# Patient Record
Sex: FEMALE | Race: WHITE | NOT HISPANIC OR LATINO | Employment: UNEMPLOYED | URBAN - NONMETROPOLITAN AREA
[De-identification: names, ages, dates, MRNs, and addresses within clinical notes are randomized per-mention and may not be internally consistent; named-entity substitution may affect disease eponyms.]

---

## 2018-09-20 ENCOUNTER — OFFICE VISIT (OUTPATIENT)
Dept: UROLOGY | Facility: CLINIC | Age: 63
End: 2018-09-20

## 2018-09-20 VITALS
OXYGEN SATURATION: 91 % | HEIGHT: 67 IN | DIASTOLIC BLOOD PRESSURE: 80 MMHG | SYSTOLIC BLOOD PRESSURE: 130 MMHG | HEART RATE: 74 BPM | TEMPERATURE: 98 F

## 2018-09-20 DIAGNOSIS — N39.0 RECURRENT UTI: Primary | ICD-10-CM

## 2018-09-20 DIAGNOSIS — R10.9 FLANK PAIN: ICD-10-CM

## 2018-09-20 LAB
BILIRUB BLD-MCNC: NEGATIVE MG/DL
CLARITY, POC: ABNORMAL
COLOR UR: YELLOW
GLUCOSE UR STRIP-MCNC: NEGATIVE MG/DL
KETONES UR QL: NEGATIVE
LEUKOCYTE EST, POC: ABNORMAL
NITRITE UR-MCNC: NEGATIVE MG/ML
PH UR: 7.5 [PH] (ref 5–8)
PROT UR STRIP-MCNC: NEGATIVE MG/DL
RBC # UR STRIP: ABNORMAL /UL
SP GR UR: 1.01 (ref 1–1.03)
UROBILINOGEN UR QL: NORMAL

## 2018-09-20 PROCEDURE — 99204 OFFICE O/P NEW MOD 45 MIN: CPT | Performed by: UROLOGY

## 2018-09-20 PROCEDURE — 51798 US URINE CAPACITY MEASURE: CPT | Performed by: UROLOGY

## 2018-09-20 PROCEDURE — 81003 URINALYSIS AUTO W/O SCOPE: CPT | Performed by: UROLOGY

## 2018-09-20 RX ORDER — BUDESONIDE AND FORMOTEROL FUMARATE DIHYDRATE 160; 4.5 UG/1; UG/1
AEROSOL RESPIRATORY (INHALATION)
COMMUNITY
Start: 2014-08-04

## 2018-09-20 RX ORDER — FLUCONAZOLE 150 MG/1
150 TABLET ORAL ONCE
Qty: 1 TABLET | Refills: 0 | Status: SHIPPED | OUTPATIENT
Start: 2018-09-20 | End: 2018-09-20

## 2018-09-20 RX ORDER — ALPRAZOLAM 1 MG/1
1 TABLET ORAL
COMMUNITY
Start: 2018-09-05

## 2018-09-20 RX ORDER — ALBUTEROL SULFATE 90 UG/1
AEROSOL, METERED RESPIRATORY (INHALATION)
COMMUNITY
Start: 2018-09-04

## 2018-09-20 RX ORDER — HYDROCHLOROTHIAZIDE 25 MG/1
TABLET ORAL
COMMUNITY
Start: 2018-09-05

## 2018-09-20 RX ORDER — SULFAMETHOXAZOLE AND TRIMETHOPRIM 800; 160 MG/1; MG/1
1 TABLET ORAL 2 TIMES DAILY
Qty: 14 TABLET | Refills: 0 | Status: SHIPPED | OUTPATIENT
Start: 2018-09-20 | End: 2018-09-27

## 2018-09-20 NOTE — PROGRESS NOTES
Chief Complaint  Frequent UTI    Referring Provider:  Steffany Rooney Abn*    HPI  Ms. Blake is a 62 y.o. female w/ hx of severe COPD who presents as a referral for multiple UTIs. She is on 4L of O2 at home.   She reports approximately 4 infections in the last 6 months.    Her symptoms with UTI include pain this located in the SP area that is nonradiaiting, associated w/ urgency, dysuria.  Its quality is severe.  It is intermittent.  She is experiencing this today.    no History of inpatient hospitalized for these infections?    yes Records of positive urine cultures? + for Klebsiella in Cx from Dr. Rooney, wagner-sensitive 8/20/18  no Relationship with the onset of these infections and sexual activity?    no Use of barrier contraception or a spermicidal products?   no Ongoing problems with constipation?     yes Urinary incontinence?  MARCIO; does not normally wear pads        yes History of smoking? 1ppd for 25 yrs        no History of gross hematuria?   She admits to vaginal dryness      Past Medical History  Past Medical History:   Diagnosis Date   • COPD (chronic obstructive pulmonary disease) (CMS/[facility])    • Depression    • Frequent UTI    • HTN (hypertension)        Past Surgical History  Past Surgical History:   Procedure Laterality Date   • ENDOSCOPY     • HYSTERECTOMY         Medications    Current Outpatient Prescriptions:   •  ALPRAZolam (XANAX) 0.5 MG tablet, , Disp: , Rfl:   •  budesonide-formoterol (SYMBICORT) 160-4.5 MCG/ACT inhaler, Symbicort 160-4.5 MCG/ACT Inhalation Aerosol; Patient Sig: Symbicort 160-4.5 MCG/ACT Inhalation Aerosol ; 0; 04-Aug-2014; Active, Disp: , Rfl:   •  hydrochlorothiazide (HYDRODIURIL) 25 MG tablet, , Disp: , Rfl:   •  sertraline (ZOLOFT) 50 MG tablet, , Disp: , Rfl:   •  tiotropium (SPIRIVA HANDIHALER) 18 MCG per inhalation capsule, Spiriva HandiHaler CAPS; Patient Sig: Spiriva HandiHaler CAPS ; 0; 04-Aug-2014; Active, Disp: , Rfl:   •  VENTOLIN  (90 Base)  "MCG/ACT inhaler, , Disp: , Rfl:     Allergies  No Known Allergies    Social History  Social History     Social History   • Marital status:      Spouse name: N/A   • Number of children: N/A   • Years of education: N/A     Occupational History   • Not on file.     Social History Main Topics   • Smoking status: Former Smoker   • Smokeless tobacco: Never Used   • Alcohol use No   • Drug use: No   • Sexual activity: Not on file     Other Topics Concern   • Not on file     Social History Narrative   • No narrative on file       Family History  She has + family history of kidney stones, in mother and brother    Review of Systems  Constitutional: No fevers or chills today; + hot flashes  Skin: Negative for rash  Endocrine: No heat/cold intolerance   Cardiovascular: Negative for chest pain or dyspnea on exertion  Respiratory: Negative for shortness of breath or wheezing  Gastrointestinal: No constipation, nausea or vomiting  Genitourinary: + for new lower urinary tract symptoms, dysuria, today  Musculoskeletal: No flank pain  Neurological:  Negative for frequent headaches or dizziness  Lymph/Heme: Negative for leg swelling or calf pain.    Physical Exam  Visit Vitals  /80   Pulse 74   Temp 98 °F (36.7 °C)   Ht 170.2 cm (67.01\")   SpO2 91%     Constitutional: NAD, WDWN.   HEENT: NCAT. Conjunctivae normal.  MMM.    Cardiovascular:  She has regular rate.  Pulmonary/Chest: Respirations are even and non-labored bilaterally.  Abdominal: Soft. No distension, tenderness, masses or guarding. No CVA tenderness. + Sp tenderness  Neurological: A + O x 3.  Cranial Nerves II-XII grossly intact.  Extremities: JUSTIN x 4, Warm. No clubbing.  No cyanosis.    Skin: Pink, warm and dry.  No rashes noted.  Psychiatric:  Normal mood and affect  Genitourinary:   deferred    Labs  Lab Results   Component Value Date    CREATININE 0.8 08/11/2014          Brief Urine Lab Results  (Last result in the past 365 days)      Color   Clarity   " Blood   Leuk Est   Nitrite   Protein   CREAT   Urine HCG        09/20/18 0941 Yellow Cloudy(A) 1+(A) Large (3+)(A) Negative Negative                 Post-Void Residual  A post-void residual was measured by ultrasonic bladder scanner.  73    Radiographic Studies  None recently       Assessment  Ms. Blake is a 62 y.o. female who presents with recurrent UTIs.    The patient's risk factors to developing recurrent UTIs include post-menopausal, obesity, and possible immune suppression with her chronic inhaled steroid.    Plan  1.  Encourage fluid consumption at the onset of a symptomatic UTI.  2.  Various treatment strategies were discussed with the patient including antibiotics in the form of on-demand, post-coital and suppressive daily dosing.  As she is post-menopausal we also discussed topical vaginal estrogen cream.  3.  Coordinate her next CT chest w/ CT a/p noncontrast to eval for stones; she will follow-up with me after her scans for cystoscopy and pelvic exam  4.  Treat empirically w/ 1 week Bactrim  5.  Send urine for Cx       Kenton Iyer MD

## 2018-11-08 ENCOUNTER — PROCEDURE VISIT (OUTPATIENT)
Dept: UROLOGY | Facility: CLINIC | Age: 63
End: 2018-11-08

## 2018-11-08 VITALS — HEIGHT: 67 IN

## 2018-11-08 DIAGNOSIS — N95.2 VAGINAL ATROPHY: ICD-10-CM

## 2018-11-08 DIAGNOSIS — N39.0 RECURRENT UTI: Primary | ICD-10-CM

## 2018-11-08 PROCEDURE — 52000 CYSTOURETHROSCOPY: CPT | Performed by: UROLOGY

## 2018-11-08 PROCEDURE — 99213 OFFICE O/P EST LOW 20 MIN: CPT | Performed by: UROLOGY

## 2018-11-08 RX ORDER — ESTRADIOL 0.1 MG/G
CREAM VAGINAL
Qty: 42.5 G | Refills: 12 | Status: SHIPPED | OUTPATIENT
Start: 2018-11-08 | End: 2019-11-25 | Stop reason: SDUPTHER

## 2018-11-08 RX ORDER — SULFAMETHOXAZOLE AND TRIMETHOPRIM 800; 160 MG/1; MG/1
1 TABLET ORAL 2 TIMES DAILY
Qty: 6 TABLET | Refills: 2 | Status: SHIPPED | OUTPATIENT
Start: 2018-11-08 | End: 2018-11-11

## 2018-11-08 NOTE — PROGRESS NOTES
Chief Complaint  Frequent UTI    Referring Provider:  No ref. provider found    HPI  Ms. Blake is a 62 y.o. female w/ hx of severe COPD who presents as a referral for multiple UTIs. She is on 4L of O2 at home.   She reports approximately 4 infections in the last 6 months.    She presents today for follow-up.  She states that her UTI symptoms have all gone away after she was treated with an antibiotic.  She also is here today for cystoscopy, in addition to a follow-up for her symptoms.    To review,  Her symptoms with UTI include pain this located in the SP area that is nonradiaiting, associated w/ urgency, dysuria.  Its quality is severe.  It is intermittent.    no History of inpatient hospitalized for these infections?    yes Records of positive urine cultures? + for Klebsiella in Cx from Dr. Rooney, wagner-sensitive 8/20/18  no Relationship with the onset of these infections and sexual activity?  no Use of barrier contraception or a spermicidal products?   no Ongoing problems with constipation?     yes Urinary incontinence?  MARCIO; does not normally wear pads      yes History of smoking? 1ppd for 25 yrs        no History of gross hematuria?   She admits to vaginal dryness    Past Medical History  Past Medical History:   Diagnosis Date   • COPD (chronic obstructive pulmonary disease) (CMS/[facility])    • Depression    • Frequent UTI    • HTN (hypertension)        Past Surgical History  Past Surgical History:   Procedure Laterality Date   • ENDOSCOPY     • HYSTERECTOMY         Medications    Current Outpatient Prescriptions:   •  ALPRAZolam (XANAX) 0.5 MG tablet, , Disp: , Rfl:   •  budesonide-formoterol (SYMBICORT) 160-4.5 MCG/ACT inhaler, Symbicort 160-4.5 MCG/ACT Inhalation Aerosol; Patient Sig: Symbicort 160-4.5 MCG/ACT Inhalation Aerosol ; 0; 04-Aug-2014; Active, Disp: , Rfl:   •  hydrochlorothiazide (HYDRODIURIL) 25 MG tablet, , Disp: , Rfl:   •  sertraline (ZOLOFT) 50 MG tablet, , Disp: , Rfl:   •  tiotropium  "(SPIRIVA HANDIHALER) 18 MCG per inhalation capsule, Spiriva HandiHaler CAPS; Patient Sig: Spiriva HandiHaler CAPS ; 0; 04-Aug-2014; Active, Disp: , Rfl:   •  VENTOLIN  (90 Base) MCG/ACT inhaler, , Disp: , Rfl:     Allergies  No Known Allergies    Social History  Social History     Social History   • Marital status:      Spouse name: N/A   • Number of children: N/A   • Years of education: N/A     Occupational History   • Not on file.     Social History Main Topics   • Smoking status: Former Smoker   • Smokeless tobacco: Never Used   • Alcohol use No   • Drug use: No   • Sexual activity: Not on file     Other Topics Concern   • Not on file     Social History Narrative   • No narrative on file       Family History  She has + family history of kidney stones, in mother and brother    Review of Systems  Constitutional: No fevers or chills today; + hot flashes  Skin: Negative for rash  Endocrine: No heat/cold intolerance   Cardiovascular: Negative for chest pain or dyspnea on exertion  Respiratory: Negative for shortness of breath or wheezing  Gastrointestinal: No constipation, nausea or vomiting  Genitourinary: + for new lower urinary tract symptoms, dysuria, today  Musculoskeletal: No flank pain  Neurological:  Negative for frequent headaches or dizziness  Lymph/Heme: Negative for leg swelling or calf pain.    Physical Exam  Visit Vitals  Ht 170.2 cm (67.01\")     Constitutional: NAD, WDWN.   HEENT: NCAT. Conjunctivae normal.  MMM.    Cardiovascular:  She has regular rate.  Pulmonary/Chest: Respirations are even and non-labored bilaterally.  Abdominal: Soft. No distension, tenderness, masses or guarding. No CVA tenderness. + Sp tenderness  Neurological: A + O x 3.  Cranial Nerves II-XII grossly intact.  Extremities: JUSTIN x 4, Warm. No clubbing.  No cyanosis.    Skin: Pink, warm and dry.  No rashes noted.  Psychiatric:  Normal mood and affect  Genitourinary:   Decreased estrogenization of the vaginal tissue.  " Cervix surgically absent.  No cystocele rectocele or fall prolapse.  No stress incontinence on cough test.  No myofascial tenderness to palpation or anterior vaginal masses    Labs  Lab Results   Component Value Date    CREATININE 0.8 08/11/2014          Brief Urine Lab Results  (Last result in the past 365 days)      Color   Clarity   Blood   Leuk Est   Nitrite   Protein   CREAT   Urine HCG        09/20/18 0941 Yellow Cloudy(A) 1+(A) Large (3+)(A) Negative Negative               Post-Void Residual  A post-void residual was measured by ultrasonic bladder scanner at last visit  73    Preprocedure diagnosis  Wisam    Postprocedure diagnosis  same    Procedure  Flexible Cystourethroscopy    Attending surgeon  Kenton Iyer MD    Anesthesia  2% lidocaine jelly intraurethrally    Complications  None    Indications  62 y.o. female undergoing a flexible cystoscopy for the above mentioned indications.    Informed consent was obtained.      Findings  Cystoscopy revealed one right and left ureteral orifice in the normal anatomic position, normal bladder mucosa and no tumors, masses or stones.      Procedure  The patient was placed in supine position and prepped and draped in sterile fashion with lidocaine jelly per urethra for anesthesia.  A timeout was performed.  The 14F flexible cystoscope was lubricated and gently placed through the urethra and into the bladder.  The bladder was completely visualized.  The cystoscope was retroflexed and the bladder neck visualized.  The scope was withdrawn and the procedure terminated.  The patient tolerated the procedure well.          Radiographic Studies  She had a CT chest abdomen pelvis performed on 11/2/18 without contrast  I have personally reviewed the images and there are no stones or hydronephrosis; the bladder is full but normal-appearing       Assessment  Ms. Blake is a 62 y.o. female who presents with recurrent UTIs.    The patient's risk factors to developing  recurrent UTIs include post-menopausal, obesity, and possible immune suppression with her chronic inhaled steroid.    Plan  1.  Encourage fluid consumption at the onset of a symptomatic UTI.  2.  Various treatment strategies were discussed with the patient including antibiotics in the form of on-demand, post-coital and suppressive daily dosing.  We will do on-demand  As she is post-menopausal we also discussed topical vaginal estrogen cream.  And I will prescribe this today  3.  Follow-up in 1 year        Kenton Iyer MD

## 2019-05-28 ENCOUNTER — LAB (OUTPATIENT)
Dept: UROLOGY | Facility: CLINIC | Age: 64
End: 2019-05-28

## 2019-05-28 DIAGNOSIS — N39.0 URINARY TRACT INFECTION WITH HEMATURIA, SITE UNSPECIFIED: Primary | ICD-10-CM

## 2019-05-28 DIAGNOSIS — R31.9 URINARY TRACT INFECTION WITH HEMATURIA, SITE UNSPECIFIED: Primary | ICD-10-CM

## 2019-05-28 LAB
BILIRUB BLD-MCNC: NEGATIVE MG/DL
CLARITY, POC: ABNORMAL
COLOR UR: YELLOW
GLUCOSE UR STRIP-MCNC: NEGATIVE MG/DL
KETONES UR QL: NEGATIVE
LEUKOCYTE EST, POC: ABNORMAL
NITRITE UR-MCNC: POSITIVE MG/ML
PH UR: 6.5 [PH] (ref 5–8)
PROT UR STRIP-MCNC: NEGATIVE MG/DL
RBC # UR STRIP: ABNORMAL /UL
SP GR UR: 1 (ref 1–1.03)
UROBILINOGEN UR QL: NORMAL

## 2019-05-28 PROCEDURE — 81003 URINALYSIS AUTO W/O SCOPE: CPT | Performed by: UROLOGY

## 2019-05-30 LAB
BACTERIA UR CULT: ABNORMAL
BACTERIA UR CULT: ABNORMAL
OTHER ANTIBIOTIC SUSC ISLT: ABNORMAL

## 2019-06-12 ENCOUNTER — TELEPHONE (OUTPATIENT)
Dept: UROLOGY | Facility: CLINIC | Age: 64
End: 2019-06-12

## 2019-06-12 DIAGNOSIS — N39.0 RECURRENT UTI: Primary | ICD-10-CM

## 2019-06-12 RX ORDER — SULFAMETHOXAZOLE AND TRIMETHOPRIM 800; 160 MG/1; MG/1
1 TABLET ORAL 2 TIMES DAILY
Qty: 6 TABLET | Refills: 1 | Status: SHIPPED | OUTPATIENT
Start: 2019-06-12 | End: 2019-11-25 | Stop reason: SDUPTHER

## 2019-06-12 NOTE — TELEPHONE ENCOUNTER
I sent them in.  Before you tell her yes over the phone, make her promise that she will always bring her urine and for culture if she is having a UTI.

## 2019-11-25 ENCOUNTER — OFFICE VISIT (OUTPATIENT)
Dept: UROLOGY | Facility: CLINIC | Age: 64
End: 2019-11-25

## 2019-11-25 VITALS
DIASTOLIC BLOOD PRESSURE: 90 MMHG | HEART RATE: 94 BPM | BODY MASS INDEX: 27 KG/M2 | OXYGEN SATURATION: 98 % | RESPIRATION RATE: 18 BRPM | WEIGHT: 172 LBS | HEIGHT: 67 IN | SYSTOLIC BLOOD PRESSURE: 138 MMHG

## 2019-11-25 DIAGNOSIS — N39.0 RECURRENT UTI: Primary | ICD-10-CM

## 2019-11-25 DIAGNOSIS — N95.2 VAGINAL ATROPHY: ICD-10-CM

## 2019-11-25 PROCEDURE — 99213 OFFICE O/P EST LOW 20 MIN: CPT | Performed by: UROLOGY

## 2019-11-25 RX ORDER — SULFAMETHOXAZOLE AND TRIMETHOPRIM 800; 160 MG/1; MG/1
1 TABLET ORAL 2 TIMES DAILY
Qty: 6 TABLET | Refills: 1 | Status: SHIPPED | OUTPATIENT
Start: 2019-11-25

## 2019-11-25 RX ORDER — ESTRADIOL 0.1 MG/G
CREAM VAGINAL
Qty: 42.5 G | Refills: 12 | Status: SHIPPED | OUTPATIENT
Start: 2019-11-25 | End: 2021-01-01 | Stop reason: SDUPTHER

## 2019-11-25 RX ORDER — CIPROFLOXACIN 250 MG/1
250 TABLET, FILM COATED ORAL EVERY 12 HOURS
Refills: 0 | COMMUNITY
Start: 2019-09-19

## 2019-11-25 NOTE — PROGRESS NOTES
Chief Complaint  Frequent UTI      HPI  Ms. Blake is a 64 y.o. female w/ hx of severe COPD who presents as a referral for multiple UTIs. She is on 4L of O2 at home.     She presents today for follow-up.  She had one interim urinary infection.  Just started taking the Estrace.    To review,  Her symptoms with UTI include pain this located in the SP area that is nonradiaiting, associated w/ urgency, dysuria.  Its quality is severe.  It is intermittent.    no History of inpatient hospitalized for these infections?    yes Records of positive urine cultures? + for Klebsiella in Cx from Dr. Rooney, wagner-sensitive 8/20/18  no Relationship with the onset of these infections and sexual activity?  no Use of barrier contraception or a spermicidal products?   no Ongoing problems with constipation?     yes Urinary incontinence?  MARCIO; does not normally wear pads      yes History of smoking? 1ppd for 25 yrs        no History of gross hematuria?   She admits to vaginal dryness    Past Medical History  Past Medical History:   Diagnosis Date   • COPD (chronic obstructive pulmonary disease) (CMS/[facility])    • Depression    • Frequent UTI    • HTN (hypertension)        Past Surgical History  Past Surgical History:   Procedure Laterality Date   • ENDOSCOPY     • HYSTERECTOMY         Medications    Current Outpatient Medications:   •  ALPRAZolam (XANAX) 0.5 MG tablet, , Disp: , Rfl:   •  budesonide-formoterol (SYMBICORT) 160-4.5 MCG/ACT inhaler, Symbicort 160-4.5 MCG/ACT Inhalation Aerosol; Patient Sig: Symbicort 160-4.5 MCG/ACT Inhalation Aerosol ; 0; 04-Aug-2014; Active, Disp: , Rfl:   •  ciprofloxacin (CIPRO) 250 MG tablet, Take 250 mg by mouth Every 12 (Twelve) Hours., Disp: , Rfl: 0  •  estradiol (ESTRACE) 0.1 MG/GM vaginal cream, Apply small dab to vaginal area 3 times per week, Disp: 42.5 g, Rfl: 12  •  hydrochlorothiazide (HYDRODIURIL) 25 MG tablet, , Disp: , Rfl:   •  sertraline (ZOLOFT) 50 MG tablet, , Disp: , Rfl:   •   "sulfamethoxazole-trimethoprim (BACTRIM DS) 800-160 MG per tablet, Take 1 tablet by mouth 2 (Two) Times a Day., Disp: 6 tablet, Rfl: 1  •  tiotropium (SPIRIVA HANDIHALER) 18 MCG per inhalation capsule, Spiriva HandiHaler CAPS; Patient Sig: Spiriva HandiHaler CAPS ; 0; 04-Aug-2014; Active, Disp: , Rfl:   •  VENTOLIN  (90 Base) MCG/ACT inhaler, , Disp: , Rfl:     Allergies  No Known Allergies    Social History  Social History     Socioeconomic History   • Marital status:      Spouse name: Not on file   • Number of children: Not on file   • Years of education: Not on file   • Highest education level: Not on file   Tobacco Use   • Smoking status: Former Smoker   • Smokeless tobacco: Never Used   Substance and Sexual Activity   • Alcohol use: No   • Drug use: No       Family History  She has + family history of kidney stones, in mother and brother    Review of Systems  Constitutional: No fevers or chills today; + hot flashes  Skin: Negative for rash  Endocrine: No heat/cold intolerance   Cardiovascular: Negative for chest pain or dyspnea on exertion  Respiratory: Negative for shortness of breath or wheezing  Gastrointestinal: No constipation, nausea or vomiting  Genitourinary: + for new lower urinary tract symptoms, dysuria, today  Musculoskeletal: No flank pain  Neurological:  Negative for frequent headaches or dizziness  Lymph/Heme: Negative for leg swelling or calf pain.    Physical Exam  Visit Vitals  /90   Pulse 94   Resp 18   Ht 170.2 cm (67.01\")   Wt 78 kg (172 lb)   SpO2 98%   BMI 26.93 kg/m²     Constitutional: NAD, WDWN.   HEENT: NCAT. Conjunctivae normal.  MMM.    Cardiovascular:  She has regular rate.  Pulmonary/Chest: Respirations are even and non-labored bilaterally.  Abdominal: Soft. No distension, tenderness, masses or guarding. No CVA tenderness. + Sp tenderness  Neurological: A + O x 3.  Cranial Nerves II-XII grossly intact.  Extremities: JUSTIN x 4, Warm. No clubbing.  No cyanosis.  "   Skin: Pink, warm and dry.  No rashes noted.  Psychiatric:  Normal mood and affect  Genitourinary:   Decreased estrogenization of the vaginal tissue.  Cervix surgically absent.  No cystocele rectocele or fall prolapse.  No stress incontinence on cough test.  No myofascial tenderness to palpation or anterior vaginal masses    Labs  Lab Results   Component Value Date    CREATININE 0.8 08/11/2014          Brief Urine Lab Results  (Last result in the past 365 days)      Color   Clarity   Blood   Leuk Est   Nitrite   Protein   CREAT   Urine HCG        05/28/19 1018 Yellow Cloudy 2+ Large (3+) Positive Negative                   Radiographic Studies  She had a CT chest abdomen pelvis performed on 11/2/18 without contrast  I have personally reviewed the images and there are no stones or hydronephrosis; the bladder is full but normal-appearing       Assessment  Ms. Blake is a 64 y.o. female who presents with recurrent UTIs.    The patient's risk factors to developing recurrent UTIs include post-menopausal, obesity, and possible immune suppression with her chronic inhaled steroid.    Plan  1.  Encourage fluid consumption at the onset of a symptomatic UTI.  2.  Various treatment strategies were discussed with the patient including antibiotics in the form of on-demand, post-coital and suppressive daily dosing.  We will do on-demand  As she is post-menopausal we also discussed topical vaginal estrogen cream.    3.  Follow-up as needed        Kenton Iyer MD

## 2021-01-01 ENCOUNTER — APPOINTMENT (OUTPATIENT)
Dept: GENERAL RADIOLOGY | Facility: HOSPITAL | Age: 66
End: 2021-01-01

## 2021-01-01 ENCOUNTER — HOSPITAL ENCOUNTER (EMERGENCY)
Facility: HOSPITAL | Age: 66
Discharge: ANOTHER HOSPITAL | End: 2021-07-02
Attending: EMERGENCY MEDICINE | Admitting: EMERGENCY MEDICINE

## 2021-01-01 ENCOUNTER — OFFICE VISIT (OUTPATIENT)
Dept: PULMONOLOGY | Facility: CLINIC | Age: 66
End: 2021-01-01

## 2021-01-01 VITALS
OXYGEN SATURATION: 87 % | HEIGHT: 67 IN | DIASTOLIC BLOOD PRESSURE: 84 MMHG | SYSTOLIC BLOOD PRESSURE: 142 MMHG | BODY MASS INDEX: 22.76 KG/M2 | RESPIRATION RATE: 16 BRPM | HEART RATE: 91 BPM | WEIGHT: 145 LBS

## 2021-01-01 VITALS
OXYGEN SATURATION: 96 % | SYSTOLIC BLOOD PRESSURE: 175 MMHG | WEIGHT: 160 LBS | TEMPERATURE: 97.8 F | RESPIRATION RATE: 24 BRPM | HEART RATE: 101 BPM | BODY MASS INDEX: 25.11 KG/M2 | DIASTOLIC BLOOD PRESSURE: 82 MMHG | HEIGHT: 67 IN

## 2021-01-01 DIAGNOSIS — R09.02 HYPOXIA: ICD-10-CM

## 2021-01-01 DIAGNOSIS — Z87.891 PERSONAL HISTORY OF TOBACCO USE, PRESENTING HAZARDS TO HEALTH: ICD-10-CM

## 2021-01-01 DIAGNOSIS — J44.9 CHRONIC OBSTRUCTIVE PULMONARY DISEASE, UNSPECIFIED COPD TYPE (HCC): ICD-10-CM

## 2021-01-01 DIAGNOSIS — J96.21 ACUTE ON CHRONIC RESPIRATORY FAILURE WITH HYPOXIA AND HYPERCAPNIA (HCC): Primary | ICD-10-CM

## 2021-01-01 DIAGNOSIS — R06.02 SHORTNESS OF BREATH: Primary | ICD-10-CM

## 2021-01-01 DIAGNOSIS — J96.22 ACUTE ON CHRONIC RESPIRATORY FAILURE WITH HYPOXIA AND HYPERCAPNIA (HCC): Primary | ICD-10-CM

## 2021-01-01 DIAGNOSIS — J43.9 PULMONARY EMPHYSEMA, UNSPECIFIED EMPHYSEMA TYPE (HCC): ICD-10-CM

## 2021-01-01 DIAGNOSIS — N39.0 RECURRENT UTI: ICD-10-CM

## 2021-01-01 DIAGNOSIS — J44.1 COPD WITH ACUTE EXACERBATION (HCC): ICD-10-CM

## 2021-01-01 DIAGNOSIS — Z96.89 CHEST TUBE IN PLACE: ICD-10-CM

## 2021-01-01 DIAGNOSIS — R06.02 SHORTNESS OF BREATH: ICD-10-CM

## 2021-01-01 DIAGNOSIS — N95.2 VAGINAL ATROPHY: ICD-10-CM

## 2021-01-01 LAB
A-A DO2: 76.2 MMHG
A-A DO2: 94.1 MMHG
A-A DO2: ABNORMAL
A-A DO2: ABNORMAL
ALBUMIN SERPL-MCNC: 3 G/DL (ref 3.5–5.2)
ALBUMIN SERPL-MCNC: 3.2 G/DL (ref 3.5–5.2)
ALBUMIN SERPL-MCNC: 3.7 G/DL (ref 3.5–5.2)
ALBUMIN/GLOB SERPL: 0.8 G/DL
ALBUMIN/GLOB SERPL: 0.9 G/DL
ALBUMIN/GLOB SERPL: 0.9 G/DL
ALP SERPL-CCNC: 60 U/L (ref 39–117)
ALP SERPL-CCNC: 63 U/L (ref 39–117)
ALP SERPL-CCNC: 76 U/L (ref 39–117)
ALT SERPL W P-5'-P-CCNC: 19 U/L (ref 1–33)
ALT SERPL W P-5'-P-CCNC: 21 U/L (ref 1–33)
ALT SERPL W P-5'-P-CCNC: 24 U/L (ref 1–33)
ANION GAP SERPL CALCULATED.3IONS-SCNC: 2.5 MMOL/L (ref 5–15)
ANION GAP SERPL CALCULATED.3IONS-SCNC: 5.3 MMOL/L (ref 5–15)
ANION GAP SERPL CALCULATED.3IONS-SCNC: 6 MMOL/L (ref 5–15)
ARTERIAL PATENCY WRIST A: ABNORMAL
ARTERIAL PATENCY WRIST A: ABNORMAL
ARTERIAL PATENCY WRIST A: POSITIVE
ARTERIAL PATENCY WRIST A: POSITIVE
AST SERPL-CCNC: 15 U/L (ref 1–32)
AST SERPL-CCNC: 17 U/L (ref 1–32)
AST SERPL-CCNC: 26 U/L (ref 1–32)
ATMOSPHERIC PRESS: 731 MMHG
ATMOSPHERIC PRESS: 733 MMHG
ATMOSPHERIC PRESS: 734 MMHG
ATMOSPHERIC PRESS: 736 MMHG
BASE EXCESS BLDA CALC-SCNC: 11.1 MMOL/L (ref 0–2)
BASE EXCESS BLDA CALC-SCNC: 12.6 MMOL/L (ref 0–2)
BASE EXCESS BLDA CALC-SCNC: 12.8 MMOL/L (ref 0–2)
BASE EXCESS BLDA CALC-SCNC: 13.4 MMOL/L (ref 0–2)
BASOPHILS # BLD AUTO: 0 10*3/MM3 (ref 0–0.2)
BASOPHILS # BLD AUTO: 0.02 10*3/MM3 (ref 0–0.2)
BASOPHILS # BLD AUTO: 0.02 10*3/MM3 (ref 0–0.2)
BASOPHILS NFR BLD AUTO: 0 % (ref 0–1.5)
BASOPHILS NFR BLD AUTO: 0.1 % (ref 0–1.5)
BASOPHILS NFR BLD AUTO: 0.1 % (ref 0–1.5)
BDY SITE: ABNORMAL
BILIRUB SERPL-MCNC: 0.2 MG/DL (ref 0–1.2)
BILIRUB SERPL-MCNC: 0.3 MG/DL (ref 0–1.2)
BILIRUB SERPL-MCNC: 0.3 MG/DL (ref 0–1.2)
BUN SERPL-MCNC: 21 MG/DL (ref 8–23)
BUN SERPL-MCNC: 23 MG/DL (ref 8–23)
BUN SERPL-MCNC: 27 MG/DL (ref 8–23)
BUN/CREAT SERPL: 32.3 (ref 7–25)
BUN/CREAT SERPL: 45.1 (ref 7–25)
BUN/CREAT SERPL: 55.1 (ref 7–25)
CALCIUM SPEC-SCNC: 9.4 MG/DL (ref 8.6–10.5)
CALCIUM SPEC-SCNC: 9.4 MG/DL (ref 8.6–10.5)
CALCIUM SPEC-SCNC: 9.6 MG/DL (ref 8.6–10.5)
CHLORIDE SERPL-SCNC: 100 MMOL/L (ref 98–107)
CHLORIDE SERPL-SCNC: 94 MMOL/L (ref 98–107)
CHLORIDE SERPL-SCNC: 96 MMOL/L (ref 98–107)
CO2 SERPL-SCNC: 34.7 MMOL/L (ref 22–29)
CO2 SERPL-SCNC: 35.5 MMOL/L (ref 22–29)
CO2 SERPL-SCNC: 40 MMOL/L (ref 22–29)
COHGB MFR BLD: 0.6 % (ref 0–2)
COHGB MFR BLD: 0.7 % (ref 0–2)
COHGB MFR BLD: 0.8 % (ref 0–2)
COHGB MFR BLD: 1.1 % (ref 0–2)
CREAT SERPL-MCNC: 0.49 MG/DL (ref 0.57–1)
CREAT SERPL-MCNC: 0.51 MG/DL (ref 0.57–1)
CREAT SERPL-MCNC: 0.65 MG/DL (ref 0.57–1)
DEPRECATED RDW RBC AUTO: 40.8 FL (ref 37–54)
DEPRECATED RDW RBC AUTO: 41.1 FL (ref 37–54)
DEPRECATED RDW RBC AUTO: 41.4 FL (ref 37–54)
EOSINOPHIL # BLD AUTO: 0.01 10*3/MM3 (ref 0–0.4)
EOSINOPHIL # BLD AUTO: 0.03 10*3/MM3 (ref 0–0.4)
EOSINOPHIL # BLD AUTO: 0.08 10*3/MM3 (ref 0–0.4)
EOSINOPHIL NFR BLD AUTO: 0.1 % (ref 0.3–6.2)
EOSINOPHIL NFR BLD AUTO: 0.2 % (ref 0.3–6.2)
EOSINOPHIL NFR BLD AUTO: 0.8 % (ref 0.3–6.2)
ERYTHROCYTE [DISTWIDTH] IN BLOOD BY AUTOMATED COUNT: 12.2 % (ref 12.3–15.4)
ERYTHROCYTE [DISTWIDTH] IN BLOOD BY AUTOMATED COUNT: 12.4 % (ref 12.3–15.4)
ERYTHROCYTE [DISTWIDTH] IN BLOOD BY AUTOMATED COUNT: 12.5 % (ref 12.3–15.4)
GAS FLOW AIRWAY: 4.5 LPM
GAS FLOW AIRWAY: 4.5 LPM
GAS FLOW AIRWAY: 6 LPM
GFR SERPL CREATININE-BSD FRML MDRD: 121 ML/MIN/1.73
GFR SERPL CREATININE-BSD FRML MDRD: 126 ML/MIN/1.73
GFR SERPL CREATININE-BSD FRML MDRD: 91 ML/MIN/1.73
GLOBULIN UR ELPH-MCNC: 3.5 GM/DL
GLOBULIN UR ELPH-MCNC: 4 GM/DL
GLOBULIN UR ELPH-MCNC: 4.3 GM/DL
GLUCOSE SERPL-MCNC: 145 MG/DL (ref 65–99)
GLUCOSE SERPL-MCNC: 155 MG/DL (ref 65–99)
GLUCOSE SERPL-MCNC: 172 MG/DL (ref 65–99)
HCO3 BLDA-SCNC: 41.8 MMOL/L (ref 22–28)
HCO3 BLDA-SCNC: 42.5 MMOL/L (ref 22–28)
HCO3 BLDA-SCNC: 43.2 MMOL/L (ref 22–28)
HCO3 BLDA-SCNC: 43.8 MMOL/L (ref 22–28)
HCT VFR BLD AUTO: 42.4 % (ref 34–46.6)
HCT VFR BLD AUTO: 43 % (ref 34–46.6)
HCT VFR BLD AUTO: 47 % (ref 34–46.6)
HCT VFR BLD CALC: 38.4 %
HCT VFR BLD CALC: 38.8 %
HCT VFR BLD CALC: 39.3 %
HCT VFR BLD CALC: 41.4 %
HGB BLD-MCNC: 12.4 G/DL (ref 12–15.9)
HGB BLD-MCNC: 12.5 G/DL (ref 12–15.9)
HGB BLD-MCNC: 13.9 G/DL (ref 12–15.9)
HOLD SPECIMEN: NORMAL
IMM GRANULOCYTES # BLD AUTO: 0.04 10*3/MM3 (ref 0–0.05)
IMM GRANULOCYTES # BLD AUTO: 0.07 10*3/MM3 (ref 0–0.05)
IMM GRANULOCYTES # BLD AUTO: 0.08 10*3/MM3 (ref 0–0.05)
IMM GRANULOCYTES NFR BLD AUTO: 0.4 % (ref 0–0.5)
IMM GRANULOCYTES NFR BLD AUTO: 0.5 % (ref 0–0.5)
IMM GRANULOCYTES NFR BLD AUTO: 0.5 % (ref 0–0.5)
INHALED O2 CONCENTRATION: 40 %
INHALED O2 CONCENTRATION: 44 %
LYMPHOCYTES # BLD AUTO: 0.32 10*3/MM3 (ref 0.7–3.1)
LYMPHOCYTES # BLD AUTO: 0.67 10*3/MM3 (ref 0.7–3.1)
LYMPHOCYTES # BLD AUTO: 0.79 10*3/MM3 (ref 0.7–3.1)
LYMPHOCYTES NFR BLD AUTO: 3.1 % (ref 19.6–45.3)
LYMPHOCYTES NFR BLD AUTO: 4.5 % (ref 19.6–45.3)
LYMPHOCYTES NFR BLD AUTO: 4.7 % (ref 19.6–45.3)
Lab: ABNORMAL
Lab: ABNORMAL
MCH RBC QN AUTO: 26.4 PG (ref 26.6–33)
MCH RBC QN AUTO: 26.5 PG (ref 26.6–33)
MCH RBC QN AUTO: 26.6 PG (ref 26.6–33)
MCHC RBC AUTO-ENTMCNC: 28.8 G/DL (ref 31.5–35.7)
MCHC RBC AUTO-ENTMCNC: 29.5 G/DL (ref 31.5–35.7)
MCHC RBC AUTO-ENTMCNC: 29.6 G/DL (ref 31.5–35.7)
MCV RBC AUTO: 89.9 FL (ref 79–97)
MCV RBC AUTO: 90 FL (ref 79–97)
MCV RBC AUTO: 91.7 FL (ref 79–97)
METHGB BLD QL: 0.7 % (ref 0–1.5)
METHGB BLD QL: 1.1 % (ref 0–1.5)
METHGB BLD QL: 1.1 % (ref 0–1.5)
METHGB BLD QL: 1.2 % (ref 0–1.5)
MODALITY: ABNORMAL
MONOCYTES # BLD AUTO: 0.14 10*3/MM3 (ref 0.1–0.9)
MONOCYTES # BLD AUTO: 0.56 10*3/MM3 (ref 0.1–0.9)
MONOCYTES # BLD AUTO: 1.11 10*3/MM3 (ref 0.1–0.9)
MONOCYTES NFR BLD AUTO: 1.3 % (ref 5–12)
MONOCYTES NFR BLD AUTO: 3.9 % (ref 5–12)
MONOCYTES NFR BLD AUTO: 6.3 % (ref 5–12)
NEUTROPHILS NFR BLD AUTO: 13.05 10*3/MM3 (ref 1.7–7)
NEUTROPHILS NFR BLD AUTO: 15.47 10*3/MM3 (ref 1.7–7)
NEUTROPHILS NFR BLD AUTO: 88.4 % (ref 42.7–76)
NEUTROPHILS NFR BLD AUTO: 9.9 10*3/MM3 (ref 1.7–7)
NEUTROPHILS NFR BLD AUTO: 90.7 % (ref 42.7–76)
NEUTROPHILS NFR BLD AUTO: 94.4 % (ref 42.7–76)
NOTE: ABNORMAL
NOTIFIED BY: ABNORMAL
NOTIFIED WHO: ABNORMAL
NRBC BLD AUTO-RTO: 0 /100 WBC (ref 0–0.2)
NT-PROBNP SERPL-MCNC: 414.7 PG/ML (ref 0–900)
OXYHGB MFR BLDV: 95.5 % (ref 94–99)
OXYHGB MFR BLDV: 96 % (ref 94–99)
OXYHGB MFR BLDV: 96.9 % (ref 94–99)
OXYHGB MFR BLDV: 96.9 % (ref 94–99)
PCO2 BLDA: 84.5 MM HG (ref 35–45)
PCO2 BLDA: 87.5 MM HG (ref 35–45)
PCO2 BLDA: 90.4 MM HG (ref 35–45)
PCO2 BLDA: 92.3 MM HG (ref 35–45)
PCO2 TEMP ADJ BLD: ABNORMAL MM[HG]
PH BLDA: 7.26 PH UNITS (ref 7.35–7.45)
PH BLDA: 7.29 PH UNITS (ref 7.35–7.45)
PH BLDA: 7.3 PH UNITS (ref 7.35–7.45)
PH BLDA: 7.31 PH UNITS (ref 7.35–7.45)
PH, TEMP CORRECTED: ABNORMAL
PLATELET # BLD AUTO: 254 10*3/MM3 (ref 140–450)
PLATELET # BLD AUTO: 267 10*3/MM3 (ref 140–450)
PLATELET # BLD AUTO: 297 10*3/MM3 (ref 140–450)
PMV BLD AUTO: 10.2 FL (ref 6–12)
PMV BLD AUTO: 10.4 FL (ref 6–12)
PMV BLD AUTO: 10.5 FL (ref 6–12)
PO2 BLDA: 113 MM HG (ref 75–100)
PO2 BLDA: 118 MM HG (ref 75–100)
PO2 BLDA: 126 MM HG (ref 75–100)
PO2 BLDA: 96.8 MM HG (ref 75–100)
PO2 TEMP ADJ BLD: ABNORMAL MM[HG]
POTASSIUM SERPL-SCNC: 5.1 MMOL/L (ref 3.5–5.2)
POTASSIUM SERPL-SCNC: 5.3 MMOL/L (ref 3.5–5.2)
POTASSIUM SERPL-SCNC: 5.3 MMOL/L (ref 3.5–5.2)
PROT SERPL-MCNC: 6.7 G/DL (ref 6–8.5)
PROT SERPL-MCNC: 7 G/DL (ref 6–8.5)
PROT SERPL-MCNC: 8 G/DL (ref 6–8.5)
RBC # BLD AUTO: 4.69 10*6/MM3 (ref 3.77–5.28)
RBC # BLD AUTO: 4.71 10*6/MM3 (ref 3.77–5.28)
RBC # BLD AUTO: 5.23 10*6/MM3 (ref 3.77–5.28)
RBC MORPH BLD: NORMAL
SAO2 % BLDCOA: 97.2 % (ref 94–100)
SAO2 % BLDCOA: 98 % (ref 94–100)
SAO2 % BLDCOA: 98.7 % (ref 94–100)
SAO2 % BLDCOA: 98.7 % (ref 94–100)
SMALL PLATELETS BLD QL SMEAR: ADEQUATE
SODIUM SERPL-SCNC: 136 MMOL/L (ref 136–145)
SODIUM SERPL-SCNC: 138 MMOL/L (ref 136–145)
SODIUM SERPL-SCNC: 140 MMOL/L (ref 136–145)
TROPONIN T SERPL-MCNC: <0.01 NG/ML (ref 0–0.03)
VANCOMYCIN SERPL-MCNC: 8.4 MCG/ML (ref 5–40)
VENTILATOR MODE: ABNORMAL
WBC # BLD AUTO: 10.48 10*3/MM3 (ref 3.4–10.8)
WBC # BLD AUTO: 14.38 10*3/MM3 (ref 3.4–10.8)
WBC # BLD AUTO: 17.5 10*3/MM3 (ref 3.4–10.8)
WBC MORPH BLD: NORMAL
WHOLE BLOOD HOLD SPECIMEN: NORMAL

## 2021-01-01 PROCEDURE — 84484 ASSAY OF TROPONIN QUANT: CPT | Performed by: EMERGENCY MEDICINE

## 2021-01-01 PROCEDURE — 96365 THER/PROPH/DIAG IV INF INIT: CPT

## 2021-01-01 PROCEDURE — 87040 BLOOD CULTURE FOR BACTERIA: CPT | Performed by: EMERGENCY MEDICINE

## 2021-01-01 PROCEDURE — 94640 AIRWAY INHALATION TREATMENT: CPT

## 2021-01-01 PROCEDURE — 93005 ELECTROCARDIOGRAM TRACING: CPT | Performed by: EMERGENCY MEDICINE

## 2021-01-01 PROCEDURE — 96366 THER/PROPH/DIAG IV INF ADDON: CPT

## 2021-01-01 PROCEDURE — 85007 BL SMEAR W/DIFF WBC COUNT: CPT | Performed by: EMERGENCY MEDICINE

## 2021-01-01 PROCEDURE — 25010000002 METHYLPREDNISOLONE PER 125 MG: Performed by: EMERGENCY MEDICINE

## 2021-01-01 PROCEDURE — 99284 EMERGENCY DEPT VISIT MOD MDM: CPT

## 2021-01-01 PROCEDURE — 99204 OFFICE O/P NEW MOD 45 MIN: CPT | Performed by: INTERNAL MEDICINE

## 2021-01-01 PROCEDURE — 80053 COMPREHEN METABOLIC PANEL: CPT | Performed by: EMERGENCY MEDICINE

## 2021-01-01 PROCEDURE — 94660 CPAP INITIATION&MGMT: CPT

## 2021-01-01 PROCEDURE — 83050 HGB METHEMOGLOBIN QUAN: CPT

## 2021-01-01 PROCEDURE — 71045 X-RAY EXAM CHEST 1 VIEW: CPT

## 2021-01-01 PROCEDURE — 25010000002 LORAZEPAM PER 2 MG: Performed by: EMERGENCY MEDICINE

## 2021-01-01 PROCEDURE — 82805 BLOOD GASES W/O2 SATURATION: CPT

## 2021-01-01 PROCEDURE — 94799 UNLISTED PULMONARY SVC/PX: CPT

## 2021-01-01 PROCEDURE — 25010000002 VANCOMYCIN 5 G RECONSTITUTED SOLUTION 5,000 MG VIAL: Performed by: EMERGENCY MEDICINE

## 2021-01-01 PROCEDURE — 96375 TX/PRO/DX INJ NEW DRUG ADDON: CPT

## 2021-01-01 PROCEDURE — 36600 WITHDRAWAL OF ARTERIAL BLOOD: CPT

## 2021-01-01 PROCEDURE — 99285 EMERGENCY DEPT VISIT HI MDM: CPT

## 2021-01-01 PROCEDURE — 96361 HYDRATE IV INFUSION ADD-ON: CPT

## 2021-01-01 PROCEDURE — 25010000002 CEFEPIME-DEXTROSE 2-5 GM-%(50ML) RECONSTITUTED SOLUTION: Performed by: EMERGENCY MEDICINE

## 2021-01-01 PROCEDURE — 85025 COMPLETE CBC W/AUTO DIFF WBC: CPT | Performed by: EMERGENCY MEDICINE

## 2021-01-01 PROCEDURE — 25010000002 VANCOMYCIN 1 G RECONSTITUTED SOLUTION 1 EACH VIAL: Performed by: EMERGENCY MEDICINE

## 2021-01-01 PROCEDURE — 80202 ASSAY OF VANCOMYCIN: CPT | Performed by: EMERGENCY MEDICINE

## 2021-01-01 PROCEDURE — 82375 ASSAY CARBOXYHB QUANT: CPT

## 2021-01-01 PROCEDURE — 96367 TX/PROPH/DG ADDL SEQ IV INF: CPT

## 2021-01-01 PROCEDURE — 96376 TX/PRO/DX INJ SAME DRUG ADON: CPT

## 2021-01-01 PROCEDURE — 25010000002 ONDANSETRON PER 1 MG: Performed by: EMERGENCY MEDICINE

## 2021-01-01 PROCEDURE — 83880 ASSAY OF NATRIURETIC PEPTIDE: CPT | Performed by: EMERGENCY MEDICINE

## 2021-01-01 PROCEDURE — 25010000002 KETOROLAC TROMETHAMINE PER 15 MG: Performed by: EMERGENCY MEDICINE

## 2021-01-01 RX ORDER — ACETAMINOPHEN 500 MG
1000 TABLET ORAL ONCE
Status: COMPLETED | OUTPATIENT
Start: 2021-01-01 | End: 2021-01-01

## 2021-01-01 RX ORDER — ALBUTEROL SULFATE 2.5 MG/3ML
SOLUTION RESPIRATORY (INHALATION)
COMMUNITY
Start: 2021-01-01

## 2021-01-01 RX ORDER — METHYLPREDNISOLONE SODIUM SUCCINATE 125 MG/2ML
125 INJECTION, POWDER, LYOPHILIZED, FOR SOLUTION INTRAMUSCULAR; INTRAVENOUS ONCE
Status: COMPLETED | OUTPATIENT
Start: 2021-01-01 | End: 2021-01-01

## 2021-01-01 RX ORDER — ONDANSETRON 2 MG/ML
4 INJECTION INTRAMUSCULAR; INTRAVENOUS ONCE
Status: COMPLETED | OUTPATIENT
Start: 2021-01-01 | End: 2021-01-01

## 2021-01-01 RX ORDER — LORAZEPAM 2 MG/ML
1 INJECTION INTRAMUSCULAR ONCE
Status: COMPLETED | OUTPATIENT
Start: 2021-01-01 | End: 2021-01-01

## 2021-01-01 RX ORDER — ALPRAZOLAM 0.5 MG/1
1 TABLET ORAL ONCE
Status: COMPLETED | OUTPATIENT
Start: 2021-01-01 | End: 2021-01-01

## 2021-01-01 RX ORDER — KETOROLAC TROMETHAMINE 30 MG/ML
10 INJECTION, SOLUTION INTRAMUSCULAR; INTRAVENOUS ONCE
Status: COMPLETED | OUTPATIENT
Start: 2021-01-01 | End: 2021-01-01

## 2021-01-01 RX ORDER — IPRATROPIUM BROMIDE AND ALBUTEROL SULFATE 2.5; .5 MG/3ML; MG/3ML
3 SOLUTION RESPIRATORY (INHALATION) ONCE
Status: COMPLETED | OUTPATIENT
Start: 2021-01-01 | End: 2021-01-01

## 2021-01-01 RX ORDER — ESTRADIOL 0.1 MG/G
CREAM VAGINAL
Qty: 42.5 G | Refills: 12 | Status: SHIPPED | OUTPATIENT
Start: 2021-01-01

## 2021-01-01 RX ORDER — CEFEPIME HYDROCHLORIDE 2 G/50ML
2 INJECTION, SOLUTION INTRAVENOUS ONCE
Status: COMPLETED | OUTPATIENT
Start: 2021-01-01 | End: 2021-01-01

## 2021-01-01 RX ORDER — SODIUM CHLORIDE 9 MG/ML
125 INJECTION, SOLUTION INTRAVENOUS CONTINUOUS
Status: DISCONTINUED | OUTPATIENT
Start: 2021-01-01 | End: 2021-01-01 | Stop reason: HOSPADM

## 2021-01-01 RX ORDER — IPRATROPIUM BROMIDE AND ALBUTEROL SULFATE 2.5; .5 MG/3ML; MG/3ML
9 SOLUTION RESPIRATORY (INHALATION) ONCE
Status: COMPLETED | OUTPATIENT
Start: 2021-01-01 | End: 2021-01-01

## 2021-01-01 RX ORDER — METHYLPREDNISOLONE SODIUM SUCCINATE 125 MG/2ML
80 INJECTION, POWDER, LYOPHILIZED, FOR SOLUTION INTRAMUSCULAR; INTRAVENOUS EVERY 8 HOURS
Status: DISCONTINUED | OUTPATIENT
Start: 2021-01-01 | End: 2021-01-01 | Stop reason: HOSPADM

## 2021-01-01 RX ORDER — SODIUM CHLORIDE 0.9 % (FLUSH) 0.9 %
10 SYRINGE (ML) INJECTION AS NEEDED
Status: DISCONTINUED | OUTPATIENT
Start: 2021-01-01 | End: 2021-01-01 | Stop reason: HOSPADM

## 2021-01-01 RX ORDER — METOPROLOL TARTRATE 5 MG/5ML
5 INJECTION INTRAVENOUS ONCE
Status: COMPLETED | OUTPATIENT
Start: 2021-01-01 | End: 2021-01-01

## 2021-01-01 RX ORDER — LISINOPRIL 10 MG/1
10 TABLET ORAL DAILY
COMMUNITY
Start: 2021-01-01

## 2021-01-01 RX ORDER — CEFEPIME HYDROCHLORIDE 2 G/50ML
2 INJECTION, SOLUTION INTRAVENOUS EVERY 8 HOURS
Status: DISCONTINUED | OUTPATIENT
Start: 2021-01-01 | End: 2021-01-01 | Stop reason: HOSPADM

## 2021-01-01 RX ADMIN — METHYLPREDNISOLONE SODIUM SUCCINATE 125 MG: 125 INJECTION, POWDER, FOR SOLUTION INTRAMUSCULAR; INTRAVENOUS at 22:21

## 2021-01-01 RX ADMIN — METHYLPREDNISOLONE SODIUM SUCCINATE 80 MG: 125 INJECTION, POWDER, FOR SOLUTION INTRAMUSCULAR; INTRAVENOUS at 05:10

## 2021-01-01 RX ADMIN — METHYLPREDNISOLONE SODIUM SUCCINATE 125 MG: 125 INJECTION, POWDER, FOR SOLUTION INTRAMUSCULAR; INTRAVENOUS at 14:08

## 2021-01-01 RX ADMIN — ACETAMINOPHEN 1000 MG: 500 TABLET ORAL at 06:32

## 2021-01-01 RX ADMIN — VANCOMYCIN HYDROCHLORIDE 1500 MG: 5 INJECTION, POWDER, LYOPHILIZED, FOR SOLUTION INTRAVENOUS at 23:57

## 2021-01-01 RX ADMIN — METOPROLOL TARTRATE 5 MG: 1 INJECTION, SOLUTION INTRAVENOUS at 10:09

## 2021-01-01 RX ADMIN — SODIUM CHLORIDE 125 ML/HR: 9 INJECTION, SOLUTION INTRAVENOUS at 18:30

## 2021-01-01 RX ADMIN — LORAZEPAM 1 MG: 2 INJECTION INTRAMUSCULAR; INTRAVENOUS at 23:38

## 2021-01-01 RX ADMIN — IPRATROPIUM BROMIDE AND ALBUTEROL SULFATE 9 ML: .5; 3 SOLUTION RESPIRATORY (INHALATION) at 22:12

## 2021-01-01 RX ADMIN — LORAZEPAM 1 MG: 2 INJECTION INTRAMUSCULAR; INTRAVENOUS at 05:28

## 2021-01-01 RX ADMIN — LORAZEPAM 1 MG: 2 INJECTION INTRAMUSCULAR; INTRAVENOUS at 07:36

## 2021-01-01 RX ADMIN — KETOROLAC TROMETHAMINE 10 MG: 30 INJECTION, SOLUTION INTRAMUSCULAR; INTRAVENOUS at 10:39

## 2021-01-01 RX ADMIN — VANCOMYCIN HYDROCHLORIDE 1000 MG: 1 INJECTION, POWDER, LYOPHILIZED, FOR SOLUTION INTRAVENOUS at 11:25

## 2021-01-01 RX ADMIN — IPRATROPIUM BROMIDE AND ALBUTEROL SULFATE 3 ML: .5; 3 SOLUTION RESPIRATORY (INHALATION) at 13:53

## 2021-01-01 RX ADMIN — CEFEPIME HYDROCHLORIDE 2 G: 2 INJECTION, SOLUTION INTRAVENOUS at 10:41

## 2021-01-01 RX ADMIN — VANCOMYCIN HYDROCHLORIDE 1250 MG: 5 INJECTION, POWDER, LYOPHILIZED, FOR SOLUTION INTRAVENOUS at 06:31

## 2021-01-01 RX ADMIN — ONDANSETRON 4 MG: 2 INJECTION INTRAMUSCULAR; INTRAVENOUS at 11:39

## 2021-01-01 RX ADMIN — ACETAMINOPHEN 1000 MG: 500 TABLET ORAL at 02:18

## 2021-01-01 RX ADMIN — CEFEPIME HYDROCHLORIDE 2 G: 2 INJECTION, SOLUTION INTRAVENOUS at 23:13

## 2021-01-01 RX ADMIN — ALPRAZOLAM 1 MG: 0.5 TABLET ORAL at 11:39

## 2021-01-01 RX ADMIN — CEFEPIME HYDROCHLORIDE 2 G: 2 INJECTION, SOLUTION INTRAVENOUS at 05:14

## 2021-05-20 NOTE — PROGRESS NOTES
CONSULT NOTE    Requested by:   Steffany Rooney MD      Chief Complaint   Patient presents with   • Consult   • Shortness of Breath       Subjective:  Mc Blake is a 65 y.o. female.     History of Present Illness   Patient comes in today for evaluation of shortness of breath. Patient says that for the past few years, she has been noticing that her exercise tolerance has been declining. The patient has had days when walking any significant distance is difficult to do without stopping in the middle, to catch her breath.     Patient also complains of some cough and phlegm production that occurs on most mornings out of the week, for most weeks out of the month, for the past few years. Patient used to smoke 1.5 pack per day for the past 30 years and quit smoking in 2004.     she is currently on oxygen. She is also on BiPAP at night for COPD.     she does have a family history of chronic obstructive disease and lung cancer, in her mother.     The following portions of the patient's history were reviewed and updated as appropriate: allergies, current medications, past family history, past medical history, past social history and past surgical history.    Review of Systems   Constitutional: Positive for fatigue. Negative for chills and fever.   HENT: Positive for rhinorrhea, sinus pressure, sinus pain, sneezing and sore throat.    Respiratory: Positive for cough, shortness of breath and wheezing. Negative for chest tightness.    Cardiovascular: Negative for palpitations and leg swelling.   Psychiatric/Behavioral: Positive for sleep disturbance.   All other systems reviewed and are negative.      Past Medical History:   Diagnosis Date   • COPD (chronic obstructive pulmonary disease) (CMS/[facility])    • Depression    • Frequent UTI    • HTN (hypertension)        Social History     Tobacco Use   • Smoking status: Former Smoker   • Smokeless tobacco: Never Used   Substance Use Topics   • Alcohol use: No  "        Objective:  Visit Vitals  /84   Pulse 91   Resp 16   Ht 170.2 cm (67\")   Wt 65.8 kg (145 lb)   SpO2 (!) 87% Comment: on RA at rest.   BMI 22.71 kg/m²   94% on 2 LPD at rest.     Physical Exam  Vitals reviewed.   Constitutional:       Appearance: She is well-developed.   Neck:      Vascular: No JVD.   Cardiovascular:      Rate and Rhythm: Normal rate and regular rhythm.   Pulmonary:      Effort: Pulmonary effort is normal.      Breath sounds: Wheezing present. No rales.      Comments: Somewhat hyperresonant to percussion.  Somewhat decreased air entry.  Mild scattered wheezing noted.   Musculoskeletal:      Cervical back: Neck supple.      Comments: Gait was slow.    Skin:     General: Skin is warm and dry.   Neurological:      Mental Status: She is alert and oriented to person, place, and time.   Psychiatric:         Behavior: Behavior normal.           Assessment/Plan:  Diagnoses and all orders for this visit:    1. Shortness of breath (Primary)  -     Cancel: Pulmonary Function Test  -     CT Chest Without Contrast Diagnostic; Future  -     Pulmonary Function Test; Future    2. Chronic obstructive pulmonary disease, unspecified COPD type (CMS/HCC)  -     Cancel: Pulmonary Function Test  -     CT Chest Without Contrast Diagnostic; Future  -     Pulmonary Function Test; Future    3. Hypoxia    4. Personal history of tobacco use, presenting hazards to health  Comments:  Quit in 2004.    5. Pulmonary emphysema, unspecified emphysema type (CMS/HCC)        Return in about 3 months (around 8/20/2021) for Recheck, PFT F/U, Imaging, For Bri, ....Also 7-8 mths w/ Dr. Villavicencio.    DISCUSSION(if any):  Last CT scan was reviewed in great detail with the patient. Images reviewed personally.   CT scan does suggest significant emphysema with minimal scarring.  The CT was from 2019.    I have also reviewed her provider's office note that mentions shortness of breath and COPD.     ===========================  " ===========================    PFTs were ordered for follow up visit.     We will try to obtain sleep study from [facility] in [place].    We will also try to obtain BiPAP settings from his [employer] company.    ===========================  ===========================    Orders as above    Based on history and physical exam, it seems that her shortness of breath is from severe obstructive lung disease.     Patient was educated on compliance with, and the correct method of using the pulmonary medicines.     Side effects, of prescribed medicines, discussed.    I have told her that we will made further recommendations, based on clinical response.     Her symptoms are suggestive of hyperinflation/air trapping.    Will advise her to use purse lip breathing.     She has been on Daliresp in the past, but didn't seem that it helped.    Continue BiPAP at night.     Will obtain settings from [employer].    Patient was given reading material, as appropriate.     The patient was advised to continue oxygen 24/7.    I have encouraged the patient to call or schedule a visit earlier, if there are any concerns.      Dictated utilizing Dragon dictation.    This document was electronically signed by Janet Villavicencio MD on 05/20/21 at 14:42 EDT

## 2021-06-07 NOTE — PROGRESS NOTES
Please call the patient regarding her CT result. The CT is stable. We will plan to repeat in 1 year.

## 2021-07-01 NOTE — ED NOTES
Contacted [facility] for a bed status. They stated they did not have a bed yet but would call us when she had one. She stated she did not have time frame for a possible assignment but she is going to contact her house supervisor to see if there has been any changes.     Marlen Samson  07/01/21 0857

## 2021-07-01 NOTE — ED NOTES
Contacted [facility] for a bed status update. They stated they do not have any ICU beds and there is an extensive waiting list. They stated at this time there is a few discharges in the ICU that are supposed to leave this afternoon and that they would call us with any further updates. AMBER Simpson notified.     Marlen Samson  07/01/21 1334

## 2021-07-01 NOTE — ED NOTES
[facility] was transferred to Dr. Yang at this time.     Marlen Samson  07/01/21 6729

## 2021-07-01 NOTE — ED NOTES
Contacted [facility] per Dr. Chao, access center advised Dr. Snell would return call.     Susanna Moya  06/30/21 2026

## 2021-07-01 NOTE — ED NOTES
Spoke to Ella at [facility] about a bed status update. Patient is still on the pending admit list. Stated that they will be able to give us a bed update at around 0730/0800. Carrie CLARK notified.      Susanna Moya  07/01/21 0524

## 2021-07-01 NOTE — ED NOTES
@ BS to change chest tube dressing. Old dressing removed, vaseline gauze replaced w/ gauze dressing and foam tape. Pt tolerated well. Chest tube with minimal yellow drainage noted. Pt tolerated well.      Tatiana Dan RN  07/01/21 4107

## 2021-07-01 NOTE — ED PROVIDER NOTES
Subjective   65-year-old female presenting with shortness of breath.  History is limited due to patient's respiratory distress.  Per report she has been feeling unwell for several hours this afternoon.  EMS noted that she was tachypneic.  Patient was just discharged from [facility] at some point in the last day or so, she was apparently there for a pneumothorax, she has a chest tube in her right chest.          Review of Systems   Unable to perform ROS: Severe respiratory distress       Past Medical History:   Diagnosis Date   • COPD (chronic obstructive pulmonary disease) (CMS/HCC)    • Depression    • Frequent UTI    • HTN (hypertension)        Allergies   Allergen Reactions   • Penicillins Hives       Past Surgical History:   Procedure Laterality Date   • ENDOSCOPY     • HYSTERECTOMY         Family History   Problem Relation Age of Onset   • Stroke Father    • Cancer Mother         lung   • Prostate cancer Brother        Social History     Socioeconomic History   • Marital status:      Spouse name: Not on file   • Number of children: Not on file   • Years of education: Not on file   • Highest education level: Not on file   Tobacco Use   • Smoking status: Former Smoker   • Smokeless tobacco: Never Used   Substance and Sexual Activity   • Alcohol use: No   • Drug use: No           Objective   Physical Exam  Constitutional:       Appearance: She is not toxic-appearing or diaphoretic.      Comments: Ill-appearing, distressed   HENT:      Head: Normocephalic and atraumatic.      Right Ear: External ear normal.      Left Ear: External ear normal.      Nose: Nose normal.      Mouth/Throat:      Mouth: Mucous membranes are moist.      Pharynx: Oropharynx is clear.   Eyes:      Extraocular Movements: Extraocular movements intact.      Conjunctiva/sclera: Conjunctivae normal.      Pupils: Pupils are equal, round, and reactive to light.   Cardiovascular:      Rate and Rhythm: Normal rate and regular rhythm.       Pulses: Normal pulses.      Heart sounds: Normal heart sounds.   Pulmonary:      Comments: Very tight wheezes throughout, tachypnea, chest tube on the right draining scant clear yellow fluid  Abdominal:      General: Bowel sounds are normal. There is no distension.      Tenderness: There is no abdominal tenderness.   Musculoskeletal:         General: No swelling, tenderness or deformity. Normal range of motion.      Cervical back: Normal range of motion.   Skin:     General: Skin is warm and dry.      Capillary Refill: Capillary refill takes less than 2 seconds.      Findings: No rash.   Neurological:      Mental Status: She is alert.   Psychiatric:         Behavior: Behavior normal.      Comments: Unable to assess         Procedures           ED Course                                           MDM  Number of Diagnoses or Management Options  Acute on chronic respiratory failure with hypoxia and hypercapnia (CMS/HCC)  Chest tube in place  COPD with acute exacerbation (CMS/HCC)  Diagnosis management comments: 65-year-old female with shortness of breath.  Ill-appearing lady who is in distress, she has tachypnea.  I do hear breath sounds bilaterally but they are diminished.  Will obtain labs, EKG, chest x-ray.  Will give symptomatic treatment.  Disposition pending.    DDx: COPD, pneumothorax, pneumonia    EKG interpreted by me: Sinus rhythm, normal rate, no acute ST changes, some nonspecific T wave changes, this is an abnormal EKG    Blood work notable for leukocytosis.  Blood gas notable for respiratory acidosis.  I put her chest tube to suction and started her on CPAP.  She is resting comfortably at this time.  I discussed the case with hospitalist at [facility] where she was just discharged from.  She has been accepted in transfer.  Patient's  updated and he is agreeable with plan of care.    35 minutes of critical care provided. This time excludes other billable procedures. Time does include preparation of  documents, medical consultations, review of old records, and direct bedside care. Patient was at high risk for life-threatening deterioration due to respiratory failure requiring NIPPV, COPD exacerbation.          Amount and/or Complexity of Data Reviewed  Decide to obtain previous medical records or to obtain history from someone other than the patient: yes        Final diagnoses:   Acute on chronic respiratory failure with hypoxia and hypercapnia (CMS/HCC)   COPD with acute exacerbation (CMS/HCC)   Chest tube in place          Prieto Chao MD  07/01/21 0052

## 2021-07-01 NOTE — ED NOTES
Contacted [facility] for a bed status update. The  stated it may not be tonight before she will get a bed but they are working on it.      Marlen Samson  07/01/21 1744       Marlen Samson  07/01/21 1747

## 2021-07-02 NOTE — ED NOTES
Spoke with Gloria with [facility]. She stated that they are still continuing to get a bed placement for the patient. Akosua Salinas RN made aware.      Stone Gamino  07/01/21 2129

## 2021-07-02 NOTE — ED NOTES
Spoke with [facility] at this time for an updated bed status. They stated they still have no beds available and no expected discharges until later today. AMBER Pastrana, notified.     Francia Salinas  07/02/21 1016

## 2021-07-02 NOTE — ED NOTES
Spoke with Gloria at [facility]. She stated they are still working on getting the patient a bed placement. Akosua Salinas, AMBER made aware.      Stone Gamino  07/02/21 0149

## 2021-07-05 LAB
BACTERIA SPEC AEROBE CULT: NORMAL
BACTERIA SPEC AEROBE CULT: NORMAL

## 2025-07-15 NOTE — ED NOTES
[facility] is paging Dr. Crawley per Dr. Yang.     Marlen Samson  07/01/21 1568     I returned pt's call, told her I was aware that her SPT was not able to be replaced at the last visit b/c placement could not be verified. I told her I had notified Dr. Armstrong already and he said he will try to place a new tube at the next appt but she may need to return to the OR for new tube placement. I also notified her that Dr. Liu had just called in refill of darafenacin to [facility] [place].